# Patient Record
Sex: FEMALE | NOT HISPANIC OR LATINO | Employment: UNEMPLOYED | URBAN - METROPOLITAN AREA
[De-identification: names, ages, dates, MRNs, and addresses within clinical notes are randomized per-mention and may not be internally consistent; named-entity substitution may affect disease eponyms.]

---
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
## 2025-03-04 ENCOUNTER — OFFICE VISIT (OUTPATIENT)
Dept: URGENT CARE | Facility: MEDICAL CENTER | Age: 20
End: 2025-03-04
Payer: COMMERCIAL

## 2025-03-04 VITALS
WEIGHT: 127 LBS | DIASTOLIC BLOOD PRESSURE: 61 MMHG | RESPIRATION RATE: 18 BRPM | BODY MASS INDEX: 23.37 KG/M2 | HEIGHT: 62 IN | HEART RATE: 104 BPM | OXYGEN SATURATION: 99 % | TEMPERATURE: 101.5 F | SYSTOLIC BLOOD PRESSURE: 107 MMHG

## 2025-03-04 DIAGNOSIS — R19.7 NAUSEA, VOMITING AND DIARRHEA: Primary | ICD-10-CM

## 2025-03-04 DIAGNOSIS — R11.2 NAUSEA, VOMITING AND DIARRHEA: Primary | ICD-10-CM

## 2025-03-04 PROCEDURE — 99203 OFFICE O/P NEW LOW 30 MIN: CPT

## 2025-03-04 RX ORDER — ONDANSETRON 4 MG/1
4 TABLET, FILM COATED ORAL EVERY 8 HOURS PRN
Qty: 20 TABLET | Refills: 0 | Status: SHIPPED | OUTPATIENT
Start: 2025-03-04

## 2025-03-04 NOTE — LETTER
March 4, 2025     Patient: Anastasia Zamudio   YOB: 2005   Date of Visit: 3/4/2025       To Whom it May Concern:    Anastasia Zamudio was seen in my clinic on 3/4/2025. She may return to work on 3/10/2025 or earlier if symptoms are improving .    If you have any questions or concerns, please don't hesitate to call.         Sincerely,          Brittany Shah PA-C        CC: No Recipients

## 2025-03-05 NOTE — PROGRESS NOTES
[facility]        NAME: Anastasia Zamudio is a 19 y.o. female  : 2005    MRN: [id number]  DATE: 2025  TIME: 8:40 PM    Assessment and Plan   Nausea, vomiting and diarrhea [R11.2, R19.7]  1. Nausea, vomiting and diarrhea  ondansetron (ZOFRAN) 4 mg tablet        Tatian consistent with viral gastroenteritis.  No signs of acute abdomen on examination.  Prescribed course of Zofran, advised symptomatic treatment.  Strict ER precautions advised.  Work note provided.    Patient Instructions     Prescribed course of Zofran, take as directed.  Drinking plenty of fluids is important. If you are unable to keep food down, you can supplement with fluids such as Pedialyte, Gatorade, or half juice/half water mixture. Make sure to drink plenty of water as well.  For diet, recommend starting with clear liquids, progressing to bland foods, then slowly introducing regular foods back into your diet. Avoidance of dairy or fatty foods is recommended while feeling unwell.  OTC Tylenol and ibuprofen for fever and abdominal pain.    Follow up with PCP in 3-5 days.  Proceed to  ER if symptoms worsen.    If tests are performed, our office will contact you with results only if changes need to made to the care plan discussed with you at the visit. You can review your full results on [facility].    Chief Complaint     Chief Complaint   Patient presents with    Abdominal Pain     Pt complains of diarrhea, stomach pain, vomiting, nausea, chills starting this morning.          History of Present Illness       Patient presents with mother for evaluation of abdominal pain, diarrhea, vomiting.  Symptoms started around 11 am today.  Patient has had about 7 episodes of vomiting today, about 5-6 episodes of diarrhea today.  Mother notes patient exposed to father, who was sick with upper respiratory and GI symptoms about 1-2 weeks ago.  Denies recent hospitalization, antibiotic use, travel to endemic areas, head injuries.   Denies history of GI issues, abdominal surgeries.    Abdominal Pain  This is a new problem. The current episode started today. The onset quality is sudden. The problem has been gradually worsening since onset. The pain is located in the generalized abdominal region. The pain is at a severity of 6/10. Quality: grumbling, turning. The pain does not radiate. Associated symptoms include a fever and myalgias. Pertinent negatives include no rash or sore throat. Nothing relieves the symptoms. Treatments tried: ginger ale, water, dramamine. The treatment provided no improvement relief.       Review of Systems   Review of Systems   Constitutional:  Positive for appetite change (decreased), chills, fatigue and fever.   HENT:  Negative for congestion, ear discharge, ear pain, postnasal drip, rhinorrhea and sore throat.    Eyes:  Negative for pain, discharge, redness and itching.   Respiratory:  Positive for shortness of breath (slight). Negative for cough, chest tightness, wheezing and stridor.    Cardiovascular:  Negative for chest pain.   Gastrointestinal:  Positive for abdominal pain.   Musculoskeletal:  Positive for myalgias.   Skin:  Negative for rash.         Current Medications       Current Outpatient Medications:     ondansetron (ZOFRAN) 4 mg tablet, Take 1 tablet (4 mg total) by mouth every 8 (eight) hours as needed for nausea or vomiting, Disp: 20 tablet, Rfl: 0    acetaminophen (TYLENOL) 500 mg tablet, Take 1 tablet (500 mg total) by mouth every 6 (six) hours as needed for mild pain, moderate pain or headaches (Patient not taking: Reported on 3/4/2025), Disp: 30 tablet, Rfl: 0    dextromethorphan-guaiFENesin (ROBITUSSIN DM)  mg/5 mL syrup, Take 10 mL by mouth 3 (three) times a day as needed for cough or congestion (Patient not taking: Reported on 3/4/2025), Disp: 237 mL, Rfl: 0    ibuprofen (MOTRIN) 400 mg tablet, Take 1 tablet (400 mg total) by mouth every 6 (six) hours as needed for mild pain, moderate pain  "or headaches (Patient not taking: Reported on 3/4/2025), Disp: 20 tablet, Rfl: 0    Current Allergies     Allergies as of 03/04/2025    (No Known Allergies)            The following portions of the patient's history were reviewed and updated as appropriate: allergies, current medications, past family history, past medical history, past social history, past surgical history and problem list.     No past medical history on file.    No past surgical history on file.    No family history on file.      Medications have been verified.        Objective   /61   Pulse 104   Temp (!) 101.5 °F (38.6 °C) (Tympanic)   Resp 18   Ht 5' 2\" (1.575 m)   Wt 57.6 kg (127 lb)   SpO2 99%   BMI 23.23 kg/m²        Physical Exam     Physical Exam  Vitals and nursing note reviewed.   Constitutional:       General: She is not in acute distress.     Appearance: Normal appearance. She is not ill-appearing.   HENT:      Right Ear: Tympanic membrane, ear canal and external ear normal.      Left Ear: Tympanic membrane, ear canal and external ear normal.      Nose: Nose normal. No congestion or rhinorrhea.      Mouth/Throat:      Mouth: Mucous membranes are moist.      Pharynx: Oropharynx is clear. No oropharyngeal exudate or posterior oropharyngeal erythema.   Eyes:      General:         Right eye: No discharge.         Left eye: No discharge.      Extraocular Movements: Extraocular movements intact.   Cardiovascular:      Rate and Rhythm: Normal rate and regular rhythm.      Pulses: Normal pulses.      Heart sounds: Normal heart sounds.   Pulmonary:      Effort: Pulmonary effort is normal. No respiratory distress.      Breath sounds: Normal breath sounds. No wheezing, rhonchi or rales.   Abdominal:      General: Abdomen is flat. Bowel sounds are normal. There is no distension.      Palpations: Abdomen is soft.      Tenderness: There is abdominal tenderness (generalized). There is no guarding.   Musculoskeletal:      Cervical back: " Neck supple.   Lymphadenopathy:      Cervical: No cervical adenopathy.   Skin:     General: Skin is warm and dry.   Neurological:      Mental Status: She is alert.

## 2025-03-05 NOTE — PATIENT INSTRUCTIONS
Prescribed course of Zofran, take as directed.  Drinking plenty of fluids is important. If you are unable to keep food down, you can supplement with fluids such as Pedialyte, Gatorade, or half juice/half water mixture. Make sure to drink plenty of water as well.  For diet, recommend starting with clear liquids, progressing to bland foods, then slowly introducing regular foods back into your diet. Avoidance of dairy or fatty foods is recommended while feeling unwell.  OTC Tylenol and ibuprofen for fever and abdominal pain.    Follow up with PCP in 3-5 days.  Proceed to  ER if symptoms worsen.    If tests are performed, our office will contact you with results only if changes need to made to the care plan discussed with you at the visit. You can review your full results on [facility].    Patient Education     Viral gastroenteritis in adults   The Basics   Written by the doctors and editors at [facility]   What is viral gastroenteritis? -- Viral gastroenteritis is an infection that can cause diarrhea and vomiting. It happens when a person's stomach and intestines get infected with a virus (figure 1). One of the most common causes of gastroenteritis is norovirus. But other viruses can cause it, too.  People can get viral gastroenteritis if they:   Touch an infected person or a surface with the virus on it, and then don't wash their hands   Eat foods or drink liquids with the virus in them. If people with the virus don't wash their hands, they can spread it to food or liquids they touch.  What are the symptoms of viral gastroenteritis? -- The infection causes diarrhea and vomiting. People can have either diarrhea or vomiting, or both. These symptoms usually start suddenly, and can be severe.  Viral gastroenteritis can also cause:   Fever   Headache or muscle aches   Belly pain or cramping   Loss of appetite  If you have a lot of diarrhea and vomiting, your body can lose too much water. This is known as  "\"dehydration.\" Dehydration can make you feel thirsty, tired, dizzy, or even confused. It can also make your urine look dark yellow.  Severe dehydration can be life-threatening. Older people are more likely to get severe dehydration.  Will I need tests? -- Not usually. Your doctor or nurse should be able to tell if you have viral gastroenteritis by learning about your symptoms and doing an exam. But the doctor or nurse might do tests to check for dehydration or to see which virus is causing the infection. These tests can include:   Blood tests   Urine tests   Tests on a sample of bowel movement  Is there anything I can do on my own to feel better? -- Yes. You need to replace the body's fluids that are lost through vomiting and diarrhea:   Drink fluids when you are able. It might help to take small sips every 15 to 30 minutes. Try to drink more as you start to feel better.   When you have a lot of vomiting or diarrhea, your body loses both water and salt. Drinking fluids that contain some salt can help replace what your body has lost. Examples include \"oral rehydration solutions,\" sports drinks, and broth. If you drink a lot of plain water, make sure you are also eating. This will help your body keep the right salt and water balance.   Avoid drinks with a lot of sugar, like juice or soda. Avoid alcohol, too.   Eat when you are able. If you can keep food down, it's best to eat lean meats, fruits, vegetables, and whole-grain breads and cereals. Avoid eating foods with a lot of fat or sugar, which can make symptoms worse.  If you are an adult younger than 65 and you have a new bout of diarrhea, and no fever and no blood in your bowel movements, you can take medicine to stop diarrhea such as loperamide (brand name: Imodium) for 1 to 2 days. But if you are older than 65, have a fever, or have blood in your bowel movements, do not take these medicines without checking with your doctor.  If you have diabetes, you might need " "to check your blood sugar more often until you feel better. Ask your doctor or nurse about this.  Should I call the doctor or nurse? -- Call the doctor or nurse if you:   Have any symptoms of dehydration, like feeling very tired, thirsty, dizzy, or confused   Have diarrhea or vomiting that lasts longer than a few days   Vomit up blood, have bloody diarrhea, or have severe belly pain   Haven't been able to drink anything for many hours   Haven't needed to urinate in the past 6 to 8 hours (during the day)  How is viral gastroenteritis treated? -- Most people do not need any treatment, because their symptoms will get better on their own. But people with severe dehydration might need treatment in the hospital. This involves getting fluids through a thin tube that goes into a vein, called an \"IV.\"  Doctors do not treat viral gastroenteritis with antibiotics. That's because antibiotics treat infections that are caused by bacteria, not viruses.  Can viral gastroenteritis be prevented? -- Sometimes. To lower the chance of getting or spreading the infection, wash your hands well with soap and water:   After you use the bathroom   Before you eat   Before you prepare food  Also, if you are caring for a child in diapers, wash your hands well after changing diapers. Do not change diapers near where you cook or eat food.  All topics are updated as new evidence becomes available and our peer review process is complete.  This topic retrieved from [employer] on: Mar 06, 2024.  Topic 89188 Version 17.0  Release: 32.2.4 - C32.64  © 2024 UpToDate, Inc. and/or its affiliates. All rights reserved.  figure 1: Digestive system     This drawing shows the organs in the body that process food. Together, these organs are called the \"digestive system\" or \"digestive tract.\" As food travels through this system, the body absorbs nutrients and water.  Graphic 49628 Version 5.0  Consumer Information Use and Disclaimer   Disclaimer: This generalized " information is a limited summary of diagnosis, treatment, and/or medication information. It is not meant to be comprehensive and should be used as a tool to help the user understand and/or assess potential diagnostic and treatment options. It does NOT include all information about conditions, treatments, medications, side effects, or risks that may apply to a specific patient. It is not intended to be medical advice or a substitute for the medical advice, diagnosis, or treatment of a health care provider based on the health care provider's examination and assessment of a patient's specific and unique circumstances. Patients must speak with a health care provider for complete information about their health, medical questions, and treatment options, including any risks or benefits regarding use of medications. This information does not endorse any treatments or medications as safe, effective, or approved for treating a specific patient. UpToDate, Inc. and its affiliates disclaim any warranty or liability relating to this information or the use thereof.The use of this information is governed by the Terms of Use, available at https://www.wolterskluwer.com/en/know/clinical-effectiveness-terms. 2024© UpToDate, Inc. and its affiliates and/or licensors. All rights reserved.  Copyright   © 2024 UpToDate, Inc. and/or its affiliates. All rights reserved.

## 2025-07-16 ENCOUNTER — OFFICE VISIT (OUTPATIENT)
Age: 20
End: 2025-07-16
Payer: COMMERCIAL

## 2025-07-16 VITALS — WEIGHT: 127 LBS | BODY MASS INDEX: 23.23 KG/M2

## 2025-07-16 DIAGNOSIS — L21.9 SEBORRHEIC DERMATITIS: Primary | ICD-10-CM

## 2025-07-16 PROCEDURE — 99214 OFFICE O/P EST MOD 30 MIN: CPT | Performed by: REGISTERED NURSE

## 2025-07-16 RX ORDER — FLUOCINONIDE TOPICAL SOLUTION USP, 0.05% 0.5 MG/ML
SOLUTION TOPICAL
Qty: 60 ML | Refills: 2 | Status: SHIPPED | OUTPATIENT
Start: 2025-07-16

## 2025-07-16 RX ORDER — KETOCONAZOLE 20 MG/ML
SHAMPOO, SUSPENSION TOPICAL
Qty: 120 ML | Refills: 2 | Status: SHIPPED | OUTPATIENT
Start: 2025-07-16

## 2025-07-16 NOTE — PROGRESS NOTES
"[facility] Dermatology Clinic Note     Patient Name: Anastasia Zamudio  Encounter Date: 7/16/25       Have you been cared for by a [facility] Dermatologist in the last 3 years and, if so, which description applies to you? NO. I am considered a \"new\" patient and must complete all patient intake questions. I am of child-bearing potential.     REVIEW OF SYSTEMS:  Have you recently had or currently have any of the following? Recent fever or chills? No  Any non-healing wound? No  Are you pregnant or planning to become pregnant? No  Are you currently or planning to be nursing or breast feeding? No   PAST MEDICAL HISTORY:  Have you personally ever had or currently have any of the following?  If \"YES,\" then please provide more detail. Skin cancer (such as Melanoma, Basal Cell Carcinoma, Squamous Cell Carcinoma?  No  Tuberculosis, HIV/AIDS, Hepatitis B or C: No  Radiation Treatment No   HISTORY OF IMMUNOSUPPRESSION:   Do you have a history of any of the following:  Systemic Immunosuppression such as Diabetes, Biologic or Immunotherapy, Chemotherapy, Organ Transplantation, Bone Marrow Transplantation or Prednsione?  No    Answering \"YES\" requires the addition of the dotphrase \"IMMUNOSUPPRESSED\" as the first diagnosis of the patient's visit.   FAMILY HISTORY:  Any \"first degree relatives\" (parent, brother, sister, or child) with the following?    Skin Cancer, Pancreatic or Other Cancer? No   PATIENT EXPERIENCE:    Do you want the Dermatologist to perform a COMPLETE skin exam today including a clinical examination under the \"bra and underwear\" areas?  NO  If necessary, do we have your permission to call and leave a detailed message on your Preferred Phone number that includes your specific medical information?  Yes      Allergies[1] Current Medications[2]          Whom besides the patient is providing clinical information about today's encounter?   NO ADDITIONAL HISTORIAN (patient alone provided history)    Physical Exam and " Assessment/Plan by Diagnosis:    SEBORRHEIC DERMATITIS     Physical Exam:  Anatomic Location Affected &  Morphological Description:  Greasy adherent scale/scaling plaques on the scalp, behind ears.  Pertinent Positives:  Pertinent Negatives:    Additional History of Present Condition:  Pt notes increased scaling on scalp. She has tried several over the counter medications with no significant improvement.     Assessment and Plan:  Based on a thorough discussion of this condition and the management approach to it (including a comprehensive discussion of the known risks, side effects and potential benefits of treatment), the patient (family) agrees to implement the following specific plan:           SCALP  ketoconazole shampoo 3 times per week for one month; after one month apply one a week for one month, and then alternate monthly with head & should. lather for 5 minutes prior to rinsing off.   Apply the Lidex 0.05% steroid solution 2 times daily for two weeks to dry scalp and leave it on. Use as needed for redness, scale and itching. After 2 weeks of use, stop and reassess, can restart if needed.       The chronic nature of this condition and association with normal skin yeast were reviewed. The goal of therapy is to control symptoms, but this is not typically something we cure and intermittent flares can be expected.    If severity merited by exam findings, topical steroids can be used for no longer than 1 week on face and 2 weeks on scalp (due to risks of atrophy, acne, etc) for significant flares for acute control, but azole therapy as above is the long term maintenance therapy of choice.     Scribe Attestation      I,:  Cinthya Rdz MA am acting as a scribe while in the presence of the attending physician.:       I,:  Valentín Calzada MD personally performed the services described in this documentation    as scribed in my presence.:                  [1] No Known Allergies  [2]   Current Outpatient  Medications:     acetaminophen (TYLENOL) 500 mg tablet, Take 1 tablet (500 mg total) by mouth every 6 (six) hours as needed for mild pain, moderate pain or headaches (Patient not taking: Reported on 7/16/2025), Disp: 30 tablet, Rfl: 0    dextromethorphan-guaiFENesin (ROBITUSSIN DM)  mg/5 mL syrup, Take 10 mL by mouth 3 (three) times a day as needed for cough or congestion (Patient not taking: Reported on 7/16/2025), Disp: 237 mL, Rfl: 0    ibuprofen (MOTRIN) 400 mg tablet, Take 1 tablet (400 mg total) by mouth every 6 (six) hours as needed for mild pain, moderate pain or headaches (Patient not taking: Reported on 7/16/2025), Disp: 20 tablet, Rfl: 0    ondansetron (ZOFRAN) 4 mg tablet, Take 1 tablet (4 mg total) by mouth every 8 (eight) hours as needed for nausea or vomiting (Patient not taking: Reported on 7/16/2025), Disp: 20 tablet, Rfl: 0

## 2025-07-16 NOTE — PATIENT INSTRUCTIONS
Assessment and Plan:  Based on a thorough discussion of this condition and the management approach to it (including a comprehensive discussion of the known risks, side effects and potential benefits of treatment), the patient (family) agrees to implement the following specific plan:           SCALP  Start OTC anti-dandruff shampoo (Head & Shoulders, Selsun Blue, Neutrogena T-gel or T-Sal) 2x/week to damp scalp, let sit 10 minutes then rinse (may use normal shampoo/conditioner thereafter as desired) and can alternate with ketoconazole shampoo 3 times per week for one month; after one month apply one a week for one month, lather for 5 minutes prior to rinsing off. For facial involvement, ketoconazole cream can be used daily for maintenance therapy, and should be used a few times a week for suppression when skin is under good control.  Apply the Lidex 0.05% steroid solution 2 times daily for two weeks to dry scalp and leave it on. Use as needed for redness, scale and itching. After 2 weeks of use, stop and reassess, can restart if needed.